# Patient Record
Sex: FEMALE | Race: OTHER | HISPANIC OR LATINO | URBAN - METROPOLITAN AREA
[De-identification: names, ages, dates, MRNs, and addresses within clinical notes are randomized per-mention and may not be internally consistent; named-entity substitution may affect disease eponyms.]

---
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
## 2023-01-05 ENCOUNTER — APPOINTMENT (OUTPATIENT)
Dept: ORTHOPEDIC SURGERY | Facility: CLINIC | Age: 18
End: 2023-01-05
Payer: MEDICAID

## 2023-01-05 VITALS
WEIGHT: 130 LBS | DIASTOLIC BLOOD PRESSURE: 61 MMHG | HEIGHT: 61 IN | BODY MASS INDEX: 24.55 KG/M2 | HEART RATE: 77 BPM | SYSTOLIC BLOOD PRESSURE: 93 MMHG

## 2023-01-05 PROCEDURE — 99024 POSTOP FOLLOW-UP VISIT: CPT

## 2023-01-05 NOTE — HISTORY OF PRESENT ILLNESS
[de-identified] : Status post right shoulder arthroscopic capsulorrhaphy on 11/14/2022 [de-identified] : DOS: 11/14/2022\selena Perez is a pleasant 17-year-old female who returns to the office today status post right shoulder arthroscopic capsulorrhaphy on 11/14/2022.  The patient states she is feeling much better since her last appointment.  She has minimal to no pain at rest.  She does have some discomfort with physical therapy.  She has been going at least twice per week.  She is pleased with the progress and returns today for routine assessment.  She has otherwise been compliant with her postoperative restrictions and has remained nonweightbearing in her sling.  The patient denies any fevers, chills, sweats, recent illnesses, numbness, tingling, weakness, or pain elsewhere at this time. [de-identified] : On exam, the patient is pleasant.  They  are awake, alert, and oriented x3.  The patient presents today with a sling.\par Weight is appropriate for height\par Full range of motion of cervical spine without instability\par Full range of motion of back without instability\par Intact neurologic, vascular, and dermatologic exam to right and left upper extremities\par Intact neurologic, vascular, and dermatologic exam to right and left lower extremities\par \par Right upper Extremity\par The patient's incisions are well-healed.  No erythema, warmth, or drainage.  No signs of infection.  There is no postoperative swelling at this point.  No bony tenderness to palpation about the shoulder.  Passive range of motion: Forward flexion to 95 degrees, external rotation to 30 degrees, internal rotation to L5.  Strength testing: Not tested today. Motor and sensory function is intact, sensations intact light touch in C5-T1 distributions, DP/PT pulses are palpable, compartments are soft and compressible, there is no calf tenderness to palpation bilaterally. [de-identified] : 17-year-old female status post right shoulder arthroscopic capsulorrhaphy on 11/14/2022 [de-identified] : Ana is recovering well from their recent surgery.  She has had improvements in her pain and swelling since her last appointment.  At this point she may discontinue the use of the sling and begin gentle active motion of the shoulder.  She will avoid any heavy lifting whatsoever.  She will continue with physical therapy as per our postoperative protocol.  A new referral for therapy was provided today.  She will follow-up in 6 weeks for repeat assessment.  She verbalizes her understanding and agrees with the plan.  All questions were answered her satisfaction.

## 2023-02-16 ENCOUNTER — APPOINTMENT (OUTPATIENT)
Dept: ORTHOPEDIC SURGERY | Facility: CLINIC | Age: 18
End: 2023-02-16
Payer: MEDICAID

## 2023-02-16 VITALS
SYSTOLIC BLOOD PRESSURE: 110 MMHG | BODY MASS INDEX: 24.55 KG/M2 | WEIGHT: 130 LBS | DIASTOLIC BLOOD PRESSURE: 73 MMHG | TEMPERATURE: 97.9 F | HEIGHT: 61 IN | HEART RATE: 69 BPM

## 2023-02-16 PROCEDURE — 99213 OFFICE O/P EST LOW 20 MIN: CPT

## 2023-02-16 NOTE — HISTORY OF PRESENT ILLNESS
[de-identified] : Status post right shoulder arthroscopic capsulorrhaphy on 11/14/2022 [de-identified] : DOS: 11/14/2022\selena Perez is a pleasant 17-year-old female who returns to the office today status post right shoulder arthroscopic capsulorrhaphy on 11/14/2022.  Patient states that she is doing well overall and has minimal to no pain at this point.  She still has a little bit of residual stiffness but has been progressing with physical therapy.  She has been going to therapy twice a week and has been compliant with her restrictions.  She returns today for routine assessment.  The patient denies any fevers, chills, sweats, recent illnesses, numbness, tingling, or pain elsewhere at this time. [de-identified] : On exam, the patient is pleasant.  They  are awake, alert, and oriented x3.  The patient presents today with no assistive devices.\par Weight is appropriate for height\par Full range of motion of cervical spine without instability\par Full range of motion of back without instability\par Intact neurologic, vascular, and dermatologic exam to right and left upper extremities\par Intact neurologic, vascular, and dermatologic exam to right and left lower extremities\par \par Right upper Extremity\par The patient's incisions are well-healed.  No erythema, warmth, or drainage.  No signs of infection.  There is no postoperative swelling at this point.  No bony tenderness to palpation about the shoulder.  Active range of motion: Forward flexion to 155, external rotation to 30, internal rotation to L3; passive range of motion: Forward flexion to 170, external Tatian to 45, internal rotation to T12.  Strength testing: Not tested today. Motor and sensory function is intact, sensations intact light touch in C5-T1 distributions, DP/PT pulses are palpable, compartments are soft and compressible, there is no calf tenderness to palpation bilaterally. [de-identified] : 17-year-old female status post right shoulder arthroscopic capsulorrhaphy on 11/14/2022 [de-identified] : Ana is recovering well from their recent surgery.  Patient has minimal to no pain and no sensations of instability today.  She still has some stiffness at the endpoints of motion.  I explained that she may have some loss of external rotation but I expect her to regain the rest of range of motion with continued physical therapy.  At this point she remain out of any contact sports and will avoid any vigorous heavy lifting.  She will continue with physical therapy to optimize her active and passive range of motion and begin early progressive and gentle strengthening exercises.  A new referral for physical therapy was provided today.  Recommend follow-up in 3 months for repeat assessment.  She verbalizes her understanding and agrees with plan.  All questions were answered to her satisfaction.

## 2023-05-18 ENCOUNTER — APPOINTMENT (OUTPATIENT)
Dept: ORTHOPEDIC SURGERY | Facility: CLINIC | Age: 18
End: 2023-05-18
Payer: MEDICAID

## 2023-05-18 VITALS
WEIGHT: 130 LBS | HEIGHT: 61 IN | SYSTOLIC BLOOD PRESSURE: 102 MMHG | BODY MASS INDEX: 24.55 KG/M2 | DIASTOLIC BLOOD PRESSURE: 69 MMHG | HEART RATE: 83 BPM

## 2023-05-18 PROCEDURE — 99213 OFFICE O/P EST LOW 20 MIN: CPT

## 2023-05-18 NOTE — HISTORY OF PRESENT ILLNESS
[de-identified] : DOS: 11/14/2022\selena Perez is a pleasant 17-year-old female who returns to the office today status post right shoulder arthroscopic capsulorrhaphy on 11/14/2022.  The patient states that she is doing well and has no pain at this point.  Her range of motion has improved and she is happy with her progress.  She has been working with physical therapy doing strengthening exercises and returns today for routine evaluation.  The patient denies any fevers, chills, sweats, recent illnesses, numbness, tingling, weakness, or pain elsewhere at this time. [de-identified] : Status post right shoulder arthroscopic capsulorrhaphy on 11/14/2022 [de-identified] : On exam, the patient is pleasant.  They  are awake, alert, and oriented x3.  The patient presents today with no assistive devices.\par Weight is appropriate for height\par Full range of motion of cervical spine without instability\par Full range of motion of back without instability\par Intact neurologic, vascular, and dermatologic exam to right and left upper extremities\par Intact neurologic, vascular, and dermatologic exam to right and left lower extremities\par \par Right upper Extremity\par The patient's incisions are well-healed.  No erythema, warmth, or drainage.  No signs of infection.  There is no postoperative swelling at this point.  No bony tenderness to palpation about the shoulder.  Active range of motion: Forward flexion to 180, external rotation to 60, internal rotation to T10;  Strength testing: Supraspinatus 5/5, infraspinatus 5/5, subscapularis 5/5. Motor and sensory function is intact, sensations intact light touch in C5-T1 distributions, DP/PT pulses are palpable, compartments are soft and compressible, there is no calf tenderness to palpation bilaterally.  Apprehension test negative [de-identified] : 17-year-old female status post right shoulder arthroscopic capsulorrhaphy on 11/14/2022 [de-identified] : Ana has recovered well from her shoulder surgery.  She has full range of motion, excellent strength, and no sensations of instability.  At this time she may gradually return to her normal activities as tolerated without any restrictions.  She may return to work without restriction.  Recommend follow-up in 6 months for repeat clinical assessment.  The patient verbalizes understanding and agrees with the plan.  All questions were answered to her satisfaction.

## 2023-09-14 NOTE — ED ADULT NURSE NOTE - CAS TRG GEN SKIN CONDITION
Patient is accompanied by and history provided by  mom  for virtual appointment to discuss starting meds for anxiety    She has been going to see a therapist for the past year and it has been helpful but as we had discussed in her well visit in the summer, next step would be to consider meds. Sleeping and eating is ok, she is prone to anxiety nelli with school, class work, home work, starting/initiating and completing assignments      Virtual appointment exam  Patient appears in no distress, alert, attentive and participating in conversation.         Patient here today for anxiety/depression.     Discussed possible treatment options including counseling (personal and family) alone, medication alone, and combination treatment  Patient willing to start or continue  behavioral therapy    Patient/parent  would like to proceed with medication . Discussed treatment options including SSRIs such as Prozac, Lexapro, or Zoloft.   SSRIs are typically first line medication in children/adolescents.     Will start on  Sertraline.   Discussed how to take, potential side effects, box warning, etc. Discussed that these medications typically take 4-6 weeks to show signs of improvement.   Reviewed how important it is to never stop medication without consulting with a physician first.   Discussed expected course of treatment.   Discussed typical follow up schedule when on these medications. All questions from patient/family answered.  Discussed mobile crisis unit and suicide hotline information. [phone]    Follow up in 2 weeks  Call sooner with any concerns/questions    
Warm

## 2023-10-04 ENCOUNTER — APPOINTMENT (OUTPATIENT)
Dept: ORTHOPEDIC SURGERY | Facility: CLINIC | Age: 18
End: 2023-10-04
Payer: MEDICAID

## 2023-10-04 VITALS
HEIGHT: 61 IN | WEIGHT: 140 LBS | HEART RATE: 86 BPM | SYSTOLIC BLOOD PRESSURE: 109 MMHG | DIASTOLIC BLOOD PRESSURE: 72 MMHG | BODY MASS INDEX: 26.43 KG/M2 | TEMPERATURE: 97.9 F

## 2023-10-04 DIAGNOSIS — M40.04 POSTURAL KYPHOSIS, THORACIC REGION: ICD-10-CM

## 2023-10-04 DIAGNOSIS — M54.50 LOW BACK PAIN, UNSPECIFIED: ICD-10-CM

## 2023-10-04 PROCEDURE — 72110 X-RAY EXAM L-2 SPINE 4/>VWS: CPT

## 2023-10-04 PROCEDURE — 99213 OFFICE O/P EST LOW 20 MIN: CPT | Mod: 25

## 2023-10-18 ENCOUNTER — APPOINTMENT (OUTPATIENT)
Dept: ORTHOPEDIC SURGERY | Facility: CLINIC | Age: 18
End: 2023-10-18

## 2023-10-19 ENCOUNTER — APPOINTMENT (OUTPATIENT)
Dept: ORTHOPEDIC SURGERY | Facility: CLINIC | Age: 18
End: 2023-10-19
Payer: MEDICAID

## 2023-10-19 VITALS
HEIGHT: 61 IN | HEART RATE: 73 BPM | BODY MASS INDEX: 26.43 KG/M2 | SYSTOLIC BLOOD PRESSURE: 106 MMHG | DIASTOLIC BLOOD PRESSURE: 73 MMHG | WEIGHT: 140 LBS

## 2023-10-19 DIAGNOSIS — M25.319 OTHER INSTABILITY, UNSPECIFIED SHOULDER: ICD-10-CM

## 2023-10-19 PROCEDURE — 99213 OFFICE O/P EST LOW 20 MIN: CPT

## 2024-04-09 ENCOUNTER — APPOINTMENT (OUTPATIENT)
Dept: ORTHOPEDIC SURGERY | Facility: CLINIC | Age: 19
End: 2024-04-09

## 2025-03-17 NOTE — ED PROVIDER NOTE - CROS ED GI ALL NEG
- - - Detail Level: Detailed Quality 130: Documentation Of Current Medications In The Medical Record: Current Medications Documented Quality 431: Preventive Care And Screening: Unhealthy Alcohol Use - Screening: Patient not identified as an unhealthy alcohol user when screened for unhealthy alcohol use using a systematic screening method Quality 137: Melanoma: Continuity Of Care - Recall System: Patient information entered into a recall system that includes: target date for the next exam specified AND a process to follow up with patients regarding missed or unscheduled appointments Quality 47: Advance Care Plan: Advance Care Planning discussed and documented; advance care plan or surrogate decision maker documented in the medical record. Quality 226: Preventive Care And Screening: Tobacco Use: Screening And Cessation Intervention: Patient screened for tobacco use and is an ex/non-smoker

## 2025-06-03 ENCOUNTER — APPOINTMENT (OUTPATIENT)
Age: 20
End: 2025-06-03
Payer: MEDICAID

## 2025-06-03 VITALS — HEIGHT: 62 IN | BODY MASS INDEX: 32.02 KG/M2 | WEIGHT: 174 LBS

## 2025-06-03 DIAGNOSIS — B35.3 TINEA PEDIS: ICD-10-CM

## 2025-06-03 PROCEDURE — 99203 OFFICE O/P NEW LOW 30 MIN: CPT

## 2025-06-03 RX ORDER — CLOTRIMAZOLE 10 MG/ML
1 SOLUTION TOPICAL TWICE DAILY
Qty: 1 | Refills: 3 | Status: ACTIVE | COMMUNITY
Start: 2025-06-03 | End: 1900-01-01